# Patient Record
Sex: FEMALE | Race: WHITE | NOT HISPANIC OR LATINO | Employment: OTHER | URBAN - METROPOLITAN AREA
[De-identification: names, ages, dates, MRNs, and addresses within clinical notes are randomized per-mention and may not be internally consistent; named-entity substitution may affect disease eponyms.]

---

## 2023-04-06 ENCOUNTER — TELEPHONE (OUTPATIENT)
Dept: SPORTS MEDICINE | Facility: CLINIC | Age: 68
End: 2023-04-06
Payer: MEDICARE

## 2023-04-06 NOTE — TELEPHONE ENCOUNTER
Spoke with pt's daughter regarding appt. She stated that pt had bilateral knee replacement in 2004. After recent hospitalization in December 2022, having a lot of pain in knees. Explained that Dr. Bowman is a non-surgical provider and that pt would need to see joint replacement surgeon. Pt's daughter was concerned about time frame until seeing Dr. Olvera. Informed her I would reach out to provider's staff regarding scheduling. Pt's daughter was grateful for the call.    ----- Message from Rocio Garcia sent at 4/6/2023  3:17 PM CDT -----  Contact: Nohemy Slater was returning the phone call. Please call her back at [phone].     Thanks  TS

## 2023-04-10 ENCOUNTER — PATIENT MESSAGE (OUTPATIENT)
Dept: SPORTS MEDICINE | Facility: CLINIC | Age: 68
End: 2023-04-10
Payer: MEDICARE

## 2023-04-10 ENCOUNTER — TELEPHONE (OUTPATIENT)
Dept: SPORTS MEDICINE | Facility: CLINIC | Age: 68
End: 2023-04-10
Payer: MEDICARE

## 2023-04-10 NOTE — TELEPHONE ENCOUNTER
ROSLYNM in reference to scheduling appt for william knee pain s/p william TKA 2004. Informed pt would need to schedule with Dr. Olvera per staff. Provided call back number to schedule.

## 2023-05-31 ENCOUNTER — PATIENT MESSAGE (OUTPATIENT)
Dept: RESEARCH | Facility: HOSPITAL | Age: 68
End: 2023-05-31
Payer: MEDICARE

## 2023-07-12 DIAGNOSIS — M25.561 PAIN IN BOTH KNEES, UNSPECIFIED CHRONICITY: Primary | ICD-10-CM

## 2023-07-12 DIAGNOSIS — M25.562 PAIN IN BOTH KNEES, UNSPECIFIED CHRONICITY: Primary | ICD-10-CM

## 2023-07-27 ENCOUNTER — OFFICE VISIT (OUTPATIENT)
Dept: ORTHOPEDICS | Facility: CLINIC | Age: 68
End: 2023-07-27
Payer: MEDICARE

## 2023-07-27 ENCOUNTER — HOSPITAL ENCOUNTER (OUTPATIENT)
Dept: RADIOLOGY | Facility: HOSPITAL | Age: 68
Discharge: HOME | End: 2023-07-27
Attending: ORTHOPAEDIC SURGERY
Payer: MEDICARE

## 2023-07-27 VITALS — BODY MASS INDEX: 50.02 KG/M2 | WEIGHT: 293 LBS | HEIGHT: 64 IN

## 2023-07-27 DIAGNOSIS — T84.82XA ARTHROFIBROSIS OF TOTAL KNEE ARTHROPLASTY, INITIAL ENCOUNTER: ICD-10-CM

## 2023-07-27 DIAGNOSIS — Z96.651 HISTORY OF TOTAL RIGHT KNEE REPLACEMENT: ICD-10-CM

## 2023-07-27 DIAGNOSIS — M25.551 BILATERAL HIP PAIN: Primary | ICD-10-CM

## 2023-07-27 DIAGNOSIS — Z96.652 HISTORY OF TOTAL LEFT KNEE REPLACEMENT: Primary | ICD-10-CM

## 2023-07-27 DIAGNOSIS — M25.552 BILATERAL HIP PAIN: Primary | ICD-10-CM

## 2023-07-27 DIAGNOSIS — M25.551 BILATERAL HIP PAIN: ICD-10-CM

## 2023-07-27 DIAGNOSIS — M25.561 PAIN IN BOTH KNEES, UNSPECIFIED CHRONICITY: ICD-10-CM

## 2023-07-27 DIAGNOSIS — M25.552 BILATERAL HIP PAIN: ICD-10-CM

## 2023-07-27 DIAGNOSIS — M25.562 PAIN IN BOTH KNEES, UNSPECIFIED CHRONICITY: ICD-10-CM

## 2023-07-27 PROBLEM — I73.9 PAD (PERIPHERAL ARTERY DISEASE): Status: ACTIVE | Noted: 2023-01-06

## 2023-07-27 PROBLEM — F41.9 ANXIETY: Status: ACTIVE | Noted: 2017-12-29

## 2023-07-27 PROBLEM — R53.81 DEBILITY: Status: ACTIVE | Noted: 2023-01-03

## 2023-07-27 PROBLEM — E78.5 HYPERLIPIDEMIA: Status: ACTIVE | Noted: 2023-07-27

## 2023-07-27 PROBLEM — L30.9 DERMATITIS: Status: ACTIVE | Noted: 2023-04-18

## 2023-07-27 PROBLEM — I10 HYPERTENSION: Status: ACTIVE | Noted: 2023-07-27

## 2023-07-27 PROBLEM — I42.1 HYPERTROPHIC OBSTRUCTIVE CARDIOMYOPATHY: Status: ACTIVE | Noted: 2023-07-27

## 2023-07-27 PROBLEM — I50.30 DIASTOLIC HEART FAILURE: Status: ACTIVE | Noted: 2023-01-02

## 2023-07-27 PROBLEM — I07.1 MODERATE TRICUSPID REGURGITATION BY PRIOR ECHOCARDIOGRAM: Status: ACTIVE | Noted: 2019-07-05

## 2023-07-27 PROBLEM — F33.1 MODERATE EPISODE OF RECURRENT MAJOR DEPRESSIVE DISORDER: Status: ACTIVE | Noted: 2023-01-24

## 2023-07-27 PROBLEM — B37.2 INTERTRIGINOUS CANDIDIASIS: Status: ACTIVE | Noted: 2023-01-02

## 2023-07-27 PROBLEM — I35.0 MODERATE AORTIC STENOSIS: Status: ACTIVE | Noted: 2019-07-05

## 2023-07-27 PROBLEM — R73.9 HYPERGLYCEMIA: Status: ACTIVE | Noted: 2023-07-10

## 2023-07-27 PROBLEM — M15.9 PRIMARY OSTEOARTHRITIS INVOLVING MULTIPLE JOINTS: Status: ACTIVE | Noted: 2023-01-23

## 2023-07-27 PROBLEM — G47.33 OSA (OBSTRUCTIVE SLEEP APNEA): Status: ACTIVE | Noted: 2023-01-02

## 2023-07-27 PROBLEM — E66.01 MORBID OBESITY WITH BODY MASS INDEX OF 60.0-69.9 IN ADULT: Status: ACTIVE | Noted: 2018-06-29

## 2023-07-27 PROBLEM — R06.00 DYSPNEA: Status: ACTIVE | Noted: 2023-07-27

## 2023-07-27 PROBLEM — G89.29 CHRONIC PAIN OF BOTH KNEES: Status: ACTIVE | Noted: 2023-01-18

## 2023-07-27 PROBLEM — K21.9 GASTROESOPHAGEAL REFLUX DISEASE WITHOUT ESOPHAGITIS: Status: ACTIVE | Noted: 2023-01-10

## 2023-07-27 PROBLEM — E66.01 MORBID OBESITY: Status: ACTIVE | Noted: 2023-07-27

## 2023-07-27 PROBLEM — R01.1 HEART MURMUR: Status: ACTIVE | Noted: 2023-07-27

## 2023-07-27 PROCEDURE — 3008F BODY MASS INDEX DOCD: CPT | Mod: CPTII,S$GLB,, | Performed by: ORTHOPAEDIC SURGERY

## 2023-07-27 PROCEDURE — 99204 PR OFFICE/OUTPT VISIT, NEW, LEVL IV, 45-59 MIN: ICD-10-PCS | Mod: S$GLB,,, | Performed by: ORTHOPAEDIC SURGERY

## 2023-07-27 PROCEDURE — 1159F PR MEDICATION LIST DOCUMENTED IN MEDICAL RECORD: ICD-10-PCS | Mod: CPTII,S$GLB,, | Performed by: ORTHOPAEDIC SURGERY

## 2023-07-27 PROCEDURE — 3288F FALL RISK ASSESSMENT DOCD: CPT | Mod: CPTII,S$GLB,, | Performed by: ORTHOPAEDIC SURGERY

## 2023-07-27 PROCEDURE — 73560 X-RAY EXAM OF KNEE 1 OR 2: CPT | Mod: TC,50

## 2023-07-27 PROCEDURE — 1160F RVW MEDS BY RX/DR IN RCRD: CPT | Mod: CPTII,S$GLB,, | Performed by: ORTHOPAEDIC SURGERY

## 2023-07-27 PROCEDURE — 3008F PR BODY MASS INDEX (BMI) DOCUMENTED: ICD-10-PCS | Mod: CPTII,S$GLB,, | Performed by: ORTHOPAEDIC SURGERY

## 2023-07-27 PROCEDURE — 99204 OFFICE O/P NEW MOD 45 MIN: CPT | Mod: S$GLB,,, | Performed by: ORTHOPAEDIC SURGERY

## 2023-07-27 PROCEDURE — 4010F ACE/ARB THERAPY RXD/TAKEN: CPT | Mod: CPTII,S$GLB,, | Performed by: ORTHOPAEDIC SURGERY

## 2023-07-27 PROCEDURE — 73560 XR KNEE 1 OR 2 VIEW BILATERAL: ICD-10-PCS | Mod: 26,50,, | Performed by: RADIOLOGY

## 2023-07-27 PROCEDURE — 4010F PR ACE/ARB THEARPY RXD/TAKEN: ICD-10-PCS | Mod: CPTII,S$GLB,, | Performed by: ORTHOPAEDIC SURGERY

## 2023-07-27 PROCEDURE — 1126F AMNT PAIN NOTED NONE PRSNT: CPT | Mod: CPTII,S$GLB,, | Performed by: ORTHOPAEDIC SURGERY

## 2023-07-27 PROCEDURE — 1101F PR PT FALLS ASSESS DOC 0-1 FALLS W/OUT INJ PAST YR: ICD-10-PCS | Mod: CPTII,S$GLB,, | Performed by: ORTHOPAEDIC SURGERY

## 2023-07-27 PROCEDURE — 3288F PR FALLS RISK ASSESSMENT DOCUMENTED: ICD-10-PCS | Mod: CPTII,S$GLB,, | Performed by: ORTHOPAEDIC SURGERY

## 2023-07-27 PROCEDURE — 1126F PR PAIN SEVERITY QUANTIFIED, NO PAIN PRESENT: ICD-10-PCS | Mod: CPTII,S$GLB,, | Performed by: ORTHOPAEDIC SURGERY

## 2023-07-27 PROCEDURE — 1160F PR REVIEW ALL MEDS BY PRESCRIBER/CLIN PHARMACIST DOCUMENTED: ICD-10-PCS | Mod: CPTII,S$GLB,, | Performed by: ORTHOPAEDIC SURGERY

## 2023-07-27 PROCEDURE — 1159F MED LIST DOCD IN RCRD: CPT | Mod: CPTII,S$GLB,, | Performed by: ORTHOPAEDIC SURGERY

## 2023-07-27 PROCEDURE — 1101F PT FALLS ASSESS-DOCD LE1/YR: CPT | Mod: CPTII,S$GLB,, | Performed by: ORTHOPAEDIC SURGERY

## 2023-07-27 PROCEDURE — 73560 X-RAY EXAM OF KNEE 1 OR 2: CPT | Mod: 26,50,, | Performed by: RADIOLOGY

## 2023-07-27 PROCEDURE — 99999 PR PBB SHADOW E&M-EST. PATIENT-LVL III: CPT | Mod: PBBFAC,,, | Performed by: ORTHOPAEDIC SURGERY

## 2023-07-27 PROCEDURE — 99999 PR PBB SHADOW E&M-EST. PATIENT-LVL III: ICD-10-PCS | Mod: PBBFAC,,, | Performed by: ORTHOPAEDIC SURGERY

## 2023-07-27 RX ORDER — FUROSEMIDE 80 MG/1
80 TABLET ORAL
COMMUNITY

## 2023-07-27 RX ORDER — AMITRIPTYLINE HYDROCHLORIDE 50 MG/1
50 TABLET, FILM COATED ORAL NIGHTLY
COMMUNITY
Start: 2023-07-03

## 2023-07-27 RX ORDER — HYDROXYZINE HYDROCHLORIDE 25 MG/1
25 TABLET, FILM COATED ORAL 2 TIMES DAILY
COMMUNITY
Start: 2023-07-10

## 2023-07-27 RX ORDER — TRIAMCINOLONE ACETONIDE 1 MG/G
CREAM TOPICAL
COMMUNITY
Start: 2023-07-27

## 2023-07-27 RX ORDER — IBUPROFEN 200 MG
200 TABLET ORAL EVERY 6 HOURS PRN
COMMUNITY

## 2023-07-27 NOTE — PATIENT INSTRUCTIONS
X-ray of both of her knees showed that your total knee replacement has not failed they still there   Your bones look like they are very weak and osteoporotic  Your hips are not moving specially on the left are all on the right there is very slight motion  You have very stiff both knees had you can not bend because her hips are stiff eventually you stiffened up and created severe scar tissue in both of her knees  The only time you can do something about stiff knees is the 1st 3-6 months where you can manipulate the knee at that time and tear the scar tissue.  Right now you are from 2005 far out and that could not be accomplished  I would like you to get x-rays on your hips because the not moving and what you describing to me heaviness in the legs maybe we can get her hips injected under x-ray machine by pain management to see if that might help at least for you to be able to move and do several steps to get around  Will schedule you for x-rays of both hips in the hospital

## 2023-07-27 NOTE — PROGRESS NOTES
Subjective:     Patient ID: Kimberley Levy is a 67 y.o. female.    Chief Complaint: Pain of the Left Knee and Pain of the Right Knee    HPI:  Inability to stand and take several steps since she was admitted in December for CHF.  She stated that in 2005 had bilateral total knee replacement by Dr. Mejia.  Afterwards she could not do her physical therapy because of loss of insurance and she had to take care of her .  She claims she was able to take 7-8 steps at least and get to do her activities of daily living however right now she has been in a nursing home since December 20 22.  Diagnosed with CHF.  She does have a walker but she can not use it.  She was sent to the nursing home for physical therapy and after certain amount the insurance denied her physical therapy.  She is looking to leave the nursing home if she can get back with her daughter and her son-in-law and live with them.  They have arrange for her to have power lift chair that she can pushed the button get her to stand she has a walker and she can walk to the bedside commode and she has a disability shower and bathroom that she can manage.  Her main issue is can not walk these 3 4 steps the hips do not move.  She feels some heaviness in the groin.  She is not complaining of any pain she is not looking to have any surgeries.  I did tell her surgery with her medical history is very risky.  She had gained a lot of weight she is now 332 lb with BMI of 57.14 due to immobility.  Her daughter helps her home and her son-in-law.  She takes ibuprofen 200 mg ordered every 6 hours as needed among the medications list      No past medical history on file.  No past surgical history on file.  No family history on file.  Social History     Socioeconomic History    Marital status:    Tobacco Use    Smoking status: Never    Smokeless tobacco: Never     Medication List with Changes/Refills   Current Medications    AMITRIPTYLINE (ELAVIL) 50 MG TABLET    Take 50  mg by mouth every evening.    AMLODIPINE (NORVASC) 10 MG TABLET    TAKE 1 TABLET EVERY DAY    BUSPIRONE (BUSPAR) 10 MG TABLET    TAKE 1 TABLET TWICE DAILY    FUROSEMIDE (LASIX) 80 MG TABLET    Take 80 mg by mouth.    HYDROXYZINE HCL (ATARAX) 25 MG TABLET    Take 25 mg by mouth 2 (two) times daily.    IBUPROFEN (ADVIL,MOTRIN) 200 MG TABLET    Take 200 mg by mouth every 6 (six) hours as needed for Pain.    LISINOPRIL (PRINIVIL,ZESTRIL) 2.5 MG TABLET    Take 1 tablet (2.5 mg total) by mouth once daily. Please call us at [phone] to schedule check up.    METOPROLOL SUCCINATE (TOPROL-XL) 25 MG 24 HR TABLET    TAKE 1 TABLET EVERY DAY    PANTOPRAZOLE (PROTONIX) 40 MG TABLET    TAKE 1 TABLET EVERY MORNING    PRAVASTATIN (PRAVACHOL) 40 MG TABLET    TAKE 1 TABLET EVERY EVENING    TERBINAFINE HCL (LAMISIL) 1 % CREAM    1 THIN LAYER  .    TRIAMCINOLONE ACETONIDE 0.1% (KENALOG) 0.1 % CREAM         Review of patient's allergies indicates:   Allergen Reactions    Penicillins Hives    Prednisone      Review of Systems   Constitutional: Negative for decreased appetite.   HENT:  Negative for tinnitus.    Eyes:  Negative for double vision.   Cardiovascular:  Negative for chest pain.   Respiratory:  Negative for wheezing.    Hematologic/Lymphatic: Negative for bleeding problem.   Skin:  Negative for dry skin.   Musculoskeletal:  Positive for arthritis, back pain, joint pain, joint swelling and stiffness. Negative for gout and neck pain.   Gastrointestinal:  Negative for abdominal pain.   Genitourinary:  Negative for bladder incontinence.   Neurological:  Negative for numbness, paresthesias and sensory change.   Psychiatric/Behavioral:  Negative for altered mental status.      Objective:   Body mass index is 57.14 kg/m².  There were no vitals filed for this visit.       General    Constitutional: She is oriented to person, place, and time. She appears well-developed.   HENT:   Head: Atraumatic.   Eyes: EOM are normal.    Pulmonary/Chest: Effort normal.   Neurological: She is alert and oriented to person, place, and time.   Psychiatric: Judgment normal.         Patient in a wheelchair  Left lower extremity there is absolutely no motion in the left hip you can not roll the hip in or out stuck in slight external rotation.  Unable to lift the leg up with severely weak flexors and absolutely no muscle twitching in abduction.    Left knee is stuck in full extension is absolutely no motion midline surgical scar from total knee replacement is healed  There is quite a bit of swelling and erythema around the proximal tibia all the way down to the ankle and pitting edema  Right lower extremity is maybe 5° of internal rotation and 5° of external rotation of the right hip.  There is maybe 5° of flexion and extension on the right knee.  Midline surgical scar healed well.  Is marked swelling.  Very weak hip musculatures unable to lift the leg up  She is able to slightly move her ankle up and down  Skin with dermatitis and cellulitis bilateral tibia    Relevant imaging results reviewed and interpreted by me, discussed with the patient and / or family today   X-ray 07/27/2023 bilateral knees with bilateral total knee arthroplasty cruciate sparing knees without evidence of any fracture.  There is calcification around the joint in the soft tissues.  There is subcutaneous swelling.  I do not see any failure of the prosthesis however patient could not stand on the legs to have the x-ray standing films  Assessment:     Encounter Diagnoses   Name Primary?    History of total left knee replacement Yes    History of total right knee replacement     Bilateral hip pain     Arthrofibrosis of total knee arthroplasty, initial encounter         Plan:   History of total left knee replacement    History of total right knee replacement    Bilateral hip pain    Arthrofibrosis of total knee arthroplasty, initial encounter         Patient Instructions   X-ray of both  of her knees showed that your total knee replacement has not failed they still there   Your bones look like they are very weak and osteoporotic  Your hips are not moving specially on the left are all on the right there is very slight motion  You have very stiff both knees had you can not bend because her hips are stiff eventually you stiffened up and created severe scar tissue in both of her knees  The only time you can do something about stiff knees is the 1st 3-6 months where you can manipulate the knee at that time and tear the scar tissue.  Right now you are from 2005 far out and that could not be accomplished  I would like you to get x-rays on your hips because the not moving and what you describing to me heaviness in the legs maybe we can get her hips injected under x-ray machine by pain management to see if that might help at least for you to be able to move and do several steps to get around  Will schedule you for x-rays of both hips in the hospital        Disclaimer: This note was prepared using a voice recognition system and is likely to have sound alike errors within the text.

## 2023-07-28 ENCOUNTER — TELEPHONE (OUTPATIENT)
Dept: ORTHOPEDICS | Facility: CLINIC | Age: 68
End: 2023-07-28
Payer: MEDICARE

## 2023-07-28 NOTE — TELEPHONE ENCOUNTER
[facility] need to do the xray at our hospital so they can be viewed in our system and so she is able to do them standing up.

## 2023-07-28 NOTE — TELEPHONE ENCOUNTER
----- Message from Nubia hSeets sent at 7/28/2023 11:14 AM CDT -----  Regarding: call back  Name of Who is Calling: Romi Mckeon or Pillo         What is the request in detail: pt has an appt on 8/7 and and xray but the xray has to be done before the appt, and nurse would like to know if the xray can be done at their facility on 8/5, please advise.         Can the clinic reply by [facility]: no           What [facility] to [facility] Back [facility]: [phone]

## 2023-08-07 ENCOUNTER — OFFICE VISIT (OUTPATIENT)
Dept: ORTHOPEDICS | Facility: CLINIC | Age: 68
End: 2023-08-07
Payer: MEDICARE

## 2023-08-07 ENCOUNTER — HOSPITAL ENCOUNTER (OUTPATIENT)
Dept: RADIOLOGY | Facility: HOSPITAL | Age: 68
Discharge: HOME | End: 2023-08-07
Attending: ORTHOPAEDIC SURGERY
Payer: MEDICARE

## 2023-08-07 ENCOUNTER — TELEPHONE (OUTPATIENT)
Dept: PAIN MEDICINE | Facility: CLINIC | Age: 68
End: 2023-08-07
Payer: MEDICARE

## 2023-08-07 VITALS — BODY MASS INDEX: 50.02 KG/M2 | HEIGHT: 64 IN | WEIGHT: 293 LBS

## 2023-08-07 DIAGNOSIS — Z96.652 HISTORY OF TOTAL LEFT KNEE REPLACEMENT: Primary | ICD-10-CM

## 2023-08-07 DIAGNOSIS — I89.0 LYMPHEDEMA ASSOCIATED WITH OBESITY: ICD-10-CM

## 2023-08-07 DIAGNOSIS — M25.552 BILATERAL HIP PAIN: ICD-10-CM

## 2023-08-07 DIAGNOSIS — E66.01 MORBID OBESITY WITH BMI OF 50.0-59.9, ADULT: ICD-10-CM

## 2023-08-07 DIAGNOSIS — M16.11 ARTHRITIS OF RIGHT HIP: ICD-10-CM

## 2023-08-07 DIAGNOSIS — M16.12 ARTHRITIS OF LEFT HIP: ICD-10-CM

## 2023-08-07 DIAGNOSIS — L40.9 PSORIASIS: ICD-10-CM

## 2023-08-07 DIAGNOSIS — Z96.651 HISTORY OF TOTAL RIGHT KNEE REPLACEMENT: ICD-10-CM

## 2023-08-07 DIAGNOSIS — E66.9 LYMPHEDEMA ASSOCIATED WITH OBESITY: ICD-10-CM

## 2023-08-07 DIAGNOSIS — T84.82XA ARTHROFIBROSIS OF TOTAL KNEE ARTHROPLASTY, INITIAL ENCOUNTER: ICD-10-CM

## 2023-08-07 DIAGNOSIS — M25.551 BILATERAL HIP PAIN: ICD-10-CM

## 2023-08-07 PROCEDURE — 73521 XR HIPS BILATERAL 2 VIEW INCL AP PELVIS: ICD-10-PCS | Mod: 26,,, | Performed by: RADIOLOGY

## 2023-08-07 PROCEDURE — 1159F PR MEDICATION LIST DOCUMENTED IN MEDICAL RECORD: ICD-10-PCS | Mod: CPTII,S$GLB,, | Performed by: ORTHOPAEDIC SURGERY

## 2023-08-07 PROCEDURE — 3008F BODY MASS INDEX DOCD: CPT | Mod: CPTII,S$GLB,, | Performed by: ORTHOPAEDIC SURGERY

## 2023-08-07 PROCEDURE — 1126F AMNT PAIN NOTED NONE PRSNT: CPT | Mod: CPTII,S$GLB,, | Performed by: ORTHOPAEDIC SURGERY

## 2023-08-07 PROCEDURE — 1159F MED LIST DOCD IN RCRD: CPT | Mod: CPTII,S$GLB,, | Performed by: ORTHOPAEDIC SURGERY

## 2023-08-07 PROCEDURE — 99214 OFFICE O/P EST MOD 30 MIN: CPT | Mod: S$GLB,,, | Performed by: ORTHOPAEDIC SURGERY

## 2023-08-07 PROCEDURE — 99999 PR PBB SHADOW E&M-EST. PATIENT-LVL III: ICD-10-PCS | Mod: PBBFAC,,, | Performed by: ORTHOPAEDIC SURGERY

## 2023-08-07 PROCEDURE — 1126F PR PAIN SEVERITY QUANTIFIED, NO PAIN PRESENT: ICD-10-PCS | Mod: CPTII,S$GLB,, | Performed by: ORTHOPAEDIC SURGERY

## 2023-08-07 PROCEDURE — 1101F PR PT FALLS ASSESS DOC 0-1 FALLS W/OUT INJ PAST YR: ICD-10-PCS | Mod: CPTII,S$GLB,, | Performed by: ORTHOPAEDIC SURGERY

## 2023-08-07 PROCEDURE — 99214 PR OFFICE/OUTPT VISIT, EST, LEVL IV, 30-39 MIN: ICD-10-PCS | Mod: S$GLB,,, | Performed by: ORTHOPAEDIC SURGERY

## 2023-08-07 PROCEDURE — 73521 X-RAY EXAM HIPS BI 2 VIEWS: CPT | Mod: TC

## 2023-08-07 PROCEDURE — 3288F PR FALLS RISK ASSESSMENT DOCUMENTED: ICD-10-PCS | Mod: CPTII,S$GLB,, | Performed by: ORTHOPAEDIC SURGERY

## 2023-08-07 PROCEDURE — 3008F PR BODY MASS INDEX (BMI) DOCUMENTED: ICD-10-PCS | Mod: CPTII,S$GLB,, | Performed by: ORTHOPAEDIC SURGERY

## 2023-08-07 PROCEDURE — 99999 PR PBB SHADOW E&M-EST. PATIENT-LVL III: CPT | Mod: PBBFAC,,, | Performed by: ORTHOPAEDIC SURGERY

## 2023-08-07 PROCEDURE — 73521 X-RAY EXAM HIPS BI 2 VIEWS: CPT | Mod: 26,,, | Performed by: RADIOLOGY

## 2023-08-07 PROCEDURE — 3288F FALL RISK ASSESSMENT DOCD: CPT | Mod: CPTII,S$GLB,, | Performed by: ORTHOPAEDIC SURGERY

## 2023-08-07 PROCEDURE — 4010F PR ACE/ARB THEARPY RXD/TAKEN: ICD-10-PCS | Mod: CPTII,S$GLB,, | Performed by: ORTHOPAEDIC SURGERY

## 2023-08-07 PROCEDURE — 1101F PT FALLS ASSESS-DOCD LE1/YR: CPT | Mod: CPTII,S$GLB,, | Performed by: ORTHOPAEDIC SURGERY

## 2023-08-07 PROCEDURE — 4010F ACE/ARB THERAPY RXD/TAKEN: CPT | Mod: CPTII,S$GLB,, | Performed by: ORTHOPAEDIC SURGERY

## 2023-08-07 NOTE — PATIENT INSTRUCTIONS
We you have psoriatic lesions on your hands  I would like you to see the Rheumatology Department for treatment.  Your arthritis could be secondary to psoriasis  Your x-rays of both of her hips showing arthritic condition and loss of joint space   You have swelling in the legs which we call lymphedema  Your goal is to be able to do transfers on your own  Recommendations  One to go see Rheumatology maybe they can treat her psoriasis and that might help with the pain so you can function better  We need extensive physical therapy to allow you to ambulate and ability to do transfers independent  We need to send you to lymphedema clinic which is therapy for the legs and they could help you with the swelling in the lower extremities at [facility] on [address]  I will refer you to pain management for bilateral hip injections under fluoroscopy

## 2023-08-07 NOTE — PROGRESS NOTES
Subjective:     Patient ID: Kimberley Levy is a 67 y.o. female.    Chief Complaint: Pain of the Left Knee, Pain of the Right Knee, Pain of the Right Hip, and Pain of the Left Hip    HPI:  Inability to stand and take several steps since she was admitted in December for CHF.  She stated that in 2005 had bilateral total knee replacement by Dr. Plaza.  Afterwards she could not do her physical therapy because of loss of insurance and she had to take care of her .  She claims she was able to take 7-8 steps at least and get to do her activities of daily living however right now she has been in a nursing home since December 20 22.  Diagnosed with CHF.  She does have a walker but she can not use it.  She was sent to the nursing home for physical therapy and after certain amount the insurance denied her physical therapy.  She is looking to leave the nursing home if she can get back with her daughter and her son-in-law and live with them.  They have arrange for her to have power lift chair that she can pushed the button get her to stand she has a walker and she can walk to the bedside commode and she has a disability shower and bathroom that she can manage.  Her main issue is can not walk these 3 4 steps the hips do not move.  She feels some heaviness in the groin.  She is not complaining of any pain she is not looking to have any surgeries.  I did tell her surgery with her medical history is very risky.  She had gained a lot of weight she is now 332 lb with BMI of 57.14 due to immobility.  Her daughter helps her home and her son-in-law.  She takes ibuprofen 200 mg ordered every 6 hours as needed among the medications list          08/07/2023   The goal as at this time to be able to get up from a sitting position to do transfers.  Like this she can go home if she can achieve that.  She has marked swelling in the lower extremities 1 leg a little bit long more than the other.  She has both knees replaced years ago.  Last  visit her exam we could not move her hips much and her pelvis tilted.  We obtained x-rays and we went over the x-rays with her showing arthritis in both of them.  She has these psoriatic lesions on her upper extremities in the dorsum of the wrist and around the forearm etcetera that had not been evaluated by Rheumatology or treated  Bilateral total knee replacements and they are right now stiff    History reviewed. No pertinent past medical history.  History reviewed. No pertinent surgical history.  History reviewed. No pertinent family history.  Social History     Socioeconomic History    Marital status:    Tobacco Use    Smoking status: Never    Smokeless tobacco: Never     Medication List with Changes/Refills   Current Medications    AMITRIPTYLINE (ELAVIL) 50 MG TABLET    Take 50 mg by mouth every evening.    AMLODIPINE (NORVASC) 10 MG TABLET    TAKE 1 TABLET EVERY DAY    BUSPIRONE (BUSPAR) 10 MG TABLET    TAKE 1 TABLET TWICE DAILY    FUROSEMIDE (LASIX) 80 MG TABLET    Take 80 mg by mouth.    HYDROXYZINE HCL (ATARAX) 25 MG TABLET    Take 25 mg by mouth 2 (two) times daily.    IBUPROFEN (ADVIL,MOTRIN) 200 MG TABLET    Take 200 mg by mouth every 6 (six) hours as needed for Pain.    LISINOPRIL (PRINIVIL,ZESTRIL) 2.5 MG TABLET    Take 1 tablet (2.5 mg total) by mouth once daily. Please call us at [phone] to schedule check up.    METOPROLOL SUCCINATE (TOPROL-XL) 25 MG 24 HR TABLET    TAKE 1 TABLET EVERY DAY    PANTOPRAZOLE (PROTONIX) 40 MG TABLET    TAKE 1 TABLET EVERY MORNING    PRAVASTATIN (PRAVACHOL) 40 MG TABLET    TAKE 1 TABLET EVERY EVENING    TERBINAFINE HCL (LAMISIL) 1 % CREAM    1 THIN LAYER  .    TRIAMCINOLONE ACETONIDE 0.1% (KENALOG) 0.1 % CREAM         Review of patient's allergies indicates:   Allergen Reactions    Penicillins Hives    Prednisone      Review of Systems   Constitutional: Negative for decreased appetite.   HENT:  Negative for tinnitus.    Eyes:  Negative for double vision.    Cardiovascular:  Negative for chest pain.   Respiratory:  Negative for wheezing.    Hematologic/Lymphatic: Negative for bleeding problem.   Skin:  Negative for dry skin.   Musculoskeletal:  Positive for arthritis, back pain, joint pain, joint swelling and stiffness. Negative for gout and neck pain.   Gastrointestinal:  Negative for abdominal pain.   Genitourinary:  Negative for bladder incontinence.   Neurological:  Negative for numbness, paresthesias and sensory change.   Psychiatric/Behavioral:  Negative for altered mental status.        Objective:   Body mass index is 57.14 kg/m².  There were no vitals filed for this visit.       General    Constitutional: She is oriented to person, place, and time. She appears well-developed.   HENT:   Head: Atraumatic.   Eyes: EOM are normal.   Pulmonary/Chest: Effort normal.   Neurological: She is alert and oriented to person, place, and time.   Psychiatric: Judgment normal.           Patient in a wheelchair  Left lower extremity there is absolutely no motion in the left hip you can not roll the hip in or out stuck in slight external rotation.  Unable to lift the leg up with severely weak flexors and absolutely no muscle twitching in abduction.    Left knee is stuck in full extension is absolutely no motion midline surgical scar from total knee replacement is healed  There is quite a bit of swelling and erythema around the proximal tibia all the way down to the ankle and pitting edema  Right lower extremity is maybe 5° of internal rotation and 5° of external rotation of the right hip.  There is maybe 5° of flexion and extension on the right knee.  Midline surgical scar healed well.  Is marked swelling.  Very weak hip musculatures unable to lift the leg up  She is able to slightly move her ankle up and down  Skin with dermatitis and cellulitis bilateral tibia  There is moderately severe lymphedema in the both lower extremities    Relevant imaging results reviewed and  interpreted by me, discussed with the patient and / or family today   X-ray 08/7/23 bilateral hips with moderately severe arthritic changes cystic changes and joint space narrowing  X-ray 07/27/2023 bilateral knees with bilateral total knee arthroplasty cruciate sparing knees without evidence of any fracture.  There is calcification around the joint in the soft tissues.  There is subcutaneous swelling.  I do not see any failure of the prosthesis however patient could not stand on the legs to have the x-ray standing films  Assessment:     Encounter Diagnoses   Name Primary?    History of total left knee replacement Yes    History of total right knee replacement     Arthrofibrosis of total knee arthroplasty, initial encounter     Arthritis of right hip     Arthritis of left hip     Morbid obesity with BMI of 50.0-59.9, adult     Lymphedema associated with obesity     Psoriasis         Plan:   History of total left knee replacement    History of total right knee replacement    Arthrofibrosis of total knee arthroplasty, initial encounter    Arthritis of right hip    Arthritis of left hip    Morbid obesity with BMI of 50.0-59.9, adult    Lymphedema associated with obesity    Psoriasis         Patient Instructions   We you have psoriatic lesions on your hands  I would like you to see the Rheumatology Department for treatment.  Your arthritis could be secondary to psoriasis  Your x-rays of both of her hips showing arthritic condition and loss of joint space   You have swelling in the legs which we call lymphedema  Your goal is to be able to do transfers on your own  Recommendations  One to go see Rheumatology maybe they can treat her psoriasis and that might help with the pain so you can function better  We need extensive physical therapy to allow you to ambulate and ability to do transfers independent  We need to send you to lymphedema clinic which is therapy for the legs and they could help you with the swelling [facility] the  [facility] [address]  I will refer you to pain management for bilateral hip injections under fluoroscopy        Disclaimer: This note was prepared using a voice recognition system and is likely to have sound alike errors within the text.

## 2023-08-08 ENCOUNTER — TELEPHONE (OUTPATIENT)
Dept: PAIN MEDICINE | Facility: CLINIC | Age: 68
End: 2023-08-08
Payer: MEDICARE

## 2023-08-10 ENCOUNTER — TELEPHONE (OUTPATIENT)
Dept: ORTHOPEDICS | Facility: CLINIC | Age: 68
End: 2023-08-10
Payer: MEDICARE

## 2023-08-10 NOTE — TELEPHONE ENCOUNTER
Left a message letting them know that the patient does not have any upcoming appointment schedule with Dr. Olvera.           ----- Message from Lissette Triana sent at 8/10/2023  1:47 PM CDT -----  Contact: [phone]/Luis E/Son in Law  Luis E called, in regards needing a call back from nurse about suggestion in what to do about the patient upcoming appointments. Thank you.

## 2023-08-10 NOTE — TELEPHONE ENCOUNTER
Returned Luis E/Son in law phone call in regards to the patient. Luis E/Son in law wanted to know if the patient should keep their appointment on 08/15/23 with the lymphedema clinic or if they should wait until October when they are schedule to see pain management for their back. Informed them to keep that appointment that is schedule for 08/15/23 with the lymphedema clinic due to the fact that will help with the swelling in their legs. Verbalized understanding.          ----- Message from Brian Payne sent at 8/10/2023  4:08 PM CDT -----  Contact: Luis E/Son in law  Luis E is needing a call back I regards to addressing some concerns about the patient. Please give him a call back at [phone]

## 2023-08-15 ENCOUNTER — CLINICAL SUPPORT (OUTPATIENT)
Dept: REHABILITATION | Facility: HOSPITAL | Age: 68
End: 2023-08-15
Attending: ORTHOPAEDIC SURGERY
Payer: MEDICARE

## 2023-08-15 DIAGNOSIS — E66.9 LYMPHEDEMA ASSOCIATED WITH OBESITY: ICD-10-CM

## 2023-08-15 DIAGNOSIS — T84.82XA ARTHROFIBROSIS OF TOTAL KNEE ARTHROPLASTY, INITIAL ENCOUNTER: ICD-10-CM

## 2023-08-15 DIAGNOSIS — L40.9 PSORIASIS: ICD-10-CM

## 2023-08-15 DIAGNOSIS — I89.0 LYMPHEDEMA ASSOCIATED WITH OBESITY: ICD-10-CM

## 2023-08-15 PROCEDURE — 97161 PT EVAL LOW COMPLEX 20 MIN: CPT | Mod: PN

## 2023-08-15 PROCEDURE — 97535 SELF CARE MNGMENT TRAINING: CPT | Mod: PN

## 2023-08-15 NOTE — PLAN OF CARE
[facility]   Physical Therapy Initial Evaluation / Lymphedema         Name: Kimberley Levy  Clinic Number: [id number]    Therapy Diagnosis:   Encounter Diagnoses   Name Primary?    Arthrofibrosis of total knee arthroplasty, initial encounter     Lymphedema associated with obesity     Psoriasis       Physician: Mauricio Olvera MD    Physician Orders: PT Eval and Treat  Medical Diagnosis from Referral: Lymphedema, arthrofibrosis of TKA  Evaluation Date: 8/15/2023  Authorization Period Expiration: 8/6/2024  Plan of Care Expiration: 11/7/2023  Visit # / Visits authorized: 1/1  Progress note due: 30 days  Visit # / Visits authorized: 1/ 1    Time In: 0805 am  Time Out: 0900 am  Total Billable Time: 38 minutes    Precautions: Standard, Fall, and CHF    Subjective   Date of onset: years  History of current condition - Kimberley reports: currently living in a SNF but no longer receiving therapy. Was referred to Lymphedema clinic due to chronic leg swelling. She does not walk much due to her legs being to heavy. She is unable to walk since her last episode of CHF. She is planning on leaving the SNF as soon as possible     Medical History:   No past medical history on file.    Surgical History:   Kimberley Levy  has no past surgical history on file.    Medications:   Kimberley has a current medication list which includes the following prescription(s): amitriptyline, amlodipine, buspirone, furosemide, hydroxyzine hcl, ibuprofen, lisinopril, metoprolol succinate, pantoprazole, pravastatin, terbinafine hcl, and triamcinolone acetonide 0.1%.    Allergies:   Review of patient's allergies indicates:   Allergen Reactions    Penicillins Hives    Prednisone         Surgery: -  Radiation:  - weeks  Chemotherapy: -   Hormonal Medications: -   PMH: CHF  Previous Lymphedema Treatment: none  Social History:  lives in a skilled nursing facility  Family Support:  Nutritional status: high BMI  Educational needs: requires  education on lymphedema   Fall risk: yes (currently uses a liana lift)   Occupation: retired  Prior Level of Function: chronic swelling both legs due to high BMI  Current Level of Function: worsening  of swelling both legs  Gait: non-amb  Transfers:uses a liana lift   Bed Mobility: dependent supine to sit     Pain:  Current 0/10, worst 7/10, best 0/10   Location: bilateral knee   Description: Aching  Aggravating Factors: standing, walking  Easing Factors: rest and elevation    Pts goals: manage swelling in both legs    Objective     STAGE  SecondaryStage II Lymphedema  Amount of Swelling: moderate   Location of Swelling: bilateral lower extremities feet, ankle, lower legs, thighs   Skin Integrity: intact   Palpation/Texture: fibrosis present, poor skin elasticity   Circulation: adequate   Posture: slumped in w/c    Range of Motion - UE/LE  (R) knee bends 10 degrees  (L) knee bends 10 degrees    Sensation: intact       Girth Measurements (in centimeters)  LANDMARK LEFT LE  8/15/2023 RIGHT LE  8/15/2023 DIFF   at eval   SBP + 20 cm - cm - cm - cm   SBP + 10 cm - cm - cm - cm   SBP 73.5 cm 79.5 cm - cm   10 cm below SBP - cm - cm - cm   20 cm below SBP 47.5 cm 47.5 cm - cm   30 cm below SBP - cm - cm - cm   35 cm below SBP - cm - cm - cm   Ankle 29.5 cm 29 cm - cm   Forefoot - cm - cm - cm         CMS Impairment/Limitation/Restriction for FOTO lower quadrant swelling Survey    Therapist reviewed FOTO scores for Kimberley Levy on 8/15/2023  FOTO documents entered into [employer] - see Media section.    Functional Score: 98%         TREATMENT   Treatment Time In: 0805 am  Treatment Time Out: 0900 am  Total Treatment time separate from Evaluation: 23 minutes     Kimberley received self care/home management to develop knowledge/understanding of lymphedema etiology, progression and treatment options x 40 minutes including:    PROVIDED LYMPHEDEMA HANDOUT AND REVIEWED THE FOLLOWING INFORMATION:  -discussed etiology of lymphedema and the  lymphatic system  -discussed cellulitis and ways to decrease risk of cellulitis  -provided lymphedema resources     -Measured bilateral lower extremities for velcro calf wraps  -faxed information for calf wraps and compression pump to [facility]    -recommended next visit to be scheduled in about a month. Kimberley was instructed to bring a caregiver to the next session to be trained on how to claudia the calf wraps effectively       Home Exercises and Patient Education Provided    Education provided:   - see self care section above  - Pt was educated in lymphedema etiology and management plans.  Pt was provided with written  risk reductions and precautions for managing lymphedema.      This patient is in agreement to participate in Lymphedema treatment.    Written Home Exercises and/or information Provided: yes.  Information was reviewed and Kimberley was able to demonstrate understanding prior to the end of the session.  Kimberley demonstrated good  understanding of the education provided.     See EMR under Media for exercises provided 8/15/2023.    Assessment   Kimberley is a 67 y.o. female referred to [facility] with a medical diagnosis of lymphedema associated with obesity. This patient presents with hx of high BMI and swelling of both extremities resulting in: Stage II lymphedema of the bilateral lower extremities, increased pain, increased stiffness in the legs, as well as difficulty performing walking, transfers and amb , and placing the pt at higher risk of infection.     Pt prognosis is Good.   Pt will benefit from skilled outpatient Physical Therapy to address the deficits stated above and in the chart below, provide pt/family education, and to maximize pt's level of independence.     Plan of care discussed with patient: Yes  Pt's spiritual, cultural and educational needs considered and patient is agreeable to the plan of care and goals as stated below:     Anticipated Barriers for therapy: w/c  dependent    Medical Necessity is demonstrated by the following  History  Co-morbidities and personal factors that may impact the plan of care [] LOW: no personal factors / co-morbidities  [] MODERATE: 1-2 personal factors / co-morbidities  [x] HIGH: 3+ personal factors / co-morbidities    Moderate / High Support Documentation:   Co-morbidities affecting plan of care: immobile, wheelchair bound, obesity, dependent for all aspects of functional mobility, CHF    Personal Factors:   no deficits     Examination  Body Structures and Functions, activity limitations and participation restrictions that may impact the plan of care [x] LOW: addressing 1-2 elements  [] MODERATE: 3+ elements  [] HIGH: 4+ elements (please support below)    Moderate / High Support Documentation: -     Clinical Presentation [x] LOW: stable  [] MODERATE: Evolving  [] HIGH: Unstable     Decision Making/ Complexity Score: low         The following goals were discussed with the patient and patient is in agreement with them as to be addressed in the treatment plan.     Short Term Goals: (6 weeks)  1. Patient will show decreased girth in bilateral lower extremities by up to 4 cm to allow for LE symmetry, shoe and clothing choice, and ability to apply needed compression.  (progressing, not met)   2. Patient will demonstrate 100% knowledge of lymphedema precautions and signs of infection to allow for reduced lymphedema risk, infection risk, and/or exacerbation of condition.  (progressing, not met)  3. Patient or caregiver will perform self-bandaging techniques and/or wearing of compression garments to allow for lymphatic drainage support, skin elasticity, and reduction in shape and size of limb. (progressing, not met)  4. Patient will perform self lymph drainage techniques to areas within reach to enhance lymphatic drainage and skin condition.  (progressing, not met)  5. Patient will tolerate daily activities with multilayered bandaging to allow for  lymphatic and venous support.  (progressing, not met)    Long Term Goals: (12  weeks)  1. Patient will show decreased girth in B LE by up to 6 cm  to allow for LE symmetry, shoe and clothing choice, and ability to apply needed compression daily.  (progressing, not met)  2. Patient will show reduction in density to mild or less with improved contour of limb to allow for cosmesis, LE symmetry, infection risk reduction, and clothing and compression choice.   (progressing, not met)  3. Patient to claudia/doff compression garment with daily compliance to assist in lymphedema management, skin elasticity, and tissue density.  (progressing, not met)  4. Pt to show improved postural awareness and alignment.  (progressing, not met)  5. Pt to be I and compliant with HEP to allow for increased function in affected limb.   (progressing, not met)    Plan   Plan of care Certification: 8/15/2023 to 11/7/2023.    Outpatient Physical Therapy 1 times weekly for 12 weeks to include the following interventions: Manual Therapy, Patient Education, Self Care, Therapeutic Activities, Therapeutic Exercise, and vaso-pneumatic compression . Complete Decongestive Therapy- compression and home equipment needs to be addressed and assisted.    Denae Mtz, PT, CLT-TAMIE

## 2023-09-15 ENCOUNTER — CLINICAL SUPPORT (OUTPATIENT)
Dept: REHABILITATION | Facility: HOSPITAL | Age: 68
End: 2023-09-15
Payer: MEDICARE

## 2023-09-15 DIAGNOSIS — I89.0 LYMPHEDEMA ASSOCIATED WITH OBESITY: Primary | ICD-10-CM

## 2023-09-15 DIAGNOSIS — E66.9 LYMPHEDEMA ASSOCIATED WITH OBESITY: Primary | ICD-10-CM

## 2023-09-15 PROCEDURE — 97535 SELF CARE MNGMENT TRAINING: CPT | Mod: PN

## 2023-09-15 NOTE — PROGRESS NOTES
[facility]   Physical Therapy Treatment Note/Lymphedema       Name: Kimberley Levy  Clinic Number: [id number]    Therapy Diagnosis:   Encounter Diagnosis   Name Primary?    Lymphedema associated with obesity Yes      Physician: Mauricio Olvera MD    Visit Date: 9/15/2023     Physician Orders: PT Eval and Treat  Medical Diagnosis from Referral: Lymphedema, arthrofibrosis of TKA  Evaluation Date: 8/15/2023  Visit # / Visits authorized: 1    Time In: 1105 am  Time Out: 12:00 pm  Total Billable Time: 55 minutes    Precautions: Standard    Subjective     Pt reports: having the velcro wraps but not sure if she is using them correctly.  She was compliant with home exercise program.  Response to previous treatment: good  Functional change: n/a        Objective     Objective Measures updated at progress report unless specified    Girth Measurements (in centimeters)  LANDMARK LEFT LE  8/15/2023 RIGHT LE  8/15/2023 DIFF   at eval   SBP + 20 cm - cm - cm - cm   SBP + 10 cm - cm - cm - cm   SBP 73.5 cm 79.5 cm - cm   10 cm below SBP - cm - cm - cm   20 cm below SBP 47.5 cm 47.5 cm - cm   30 cm below SBP - cm - cm - cm   35 cm below SBP - cm - cm - cm   Ankle 29.5 cm 29 cm - cm   Forefoot - cm - cm - cm      Girth Measurements (in centimeters)  LANDMARK LEFT LE  9/15/2023 RIGHT LE  9/15/2023    SBP + 20 cm - cm - cm    SBP + 10 cm - cm - cm    SBP 74.5 cm 80 cm    10 cm below SBP - cm - cm    20 cm below SBP 45.5 cm 48.5 cm    30 cm below SBP - cm - cm    35 cm below SBP - cm - cm    Ankle 29.5 cm 29 cm    Forefoot - cm - cm      Treatment:     Kimberley received the following self care and home management x 55 minutes:    -training with patient and sitter on donning of compression socks and donning and doffing of velcro wraps with emphasis on adjustment of the straps  -faxed plan of care and 28+ day follow up visit information to [facility] for a compression pump  -recommended wearing velcro wraps day and  night initially to help push fluid out of lower extremities.  -recommended compression marck's for compression on the thighs  -home pump recommended to manage fluid      Assessment     Kimberley brought a sitter to today's visit to learn how to don the velcro wraps effectively. She received her compression over a week ago and wanted her caregivers to be able to help her.    Kimberley Is progressing well towards her goals. She has tried elevation, exercise, compression garments/bandaging for since 8/15/2023 and swelling still persists. Home pump is recommended   Pt prognosis is Good.     Pt will continue to benefit from skilled outpatient physical therapy to address the deficits listed in the problem list box on initial evaluation, provide pt/family education and to maximize pt's level of independence in the home and community environment.     Pt's spiritual, cultural and educational needs considered and pt agreeable to plan of care and goals.       Goals:     Short Term Goals: (6 weeks)  1. Patient will show decreased girth in bilateral lower extremities by up to 4 cm to allow for LE symmetry, shoe and clothing choice, and ability to apply needed compression.  (progressing, not met)   2. Patient will demonstrate 100% knowledge of lymphedema precautions and signs of infection to allow for reduced lymphedema risk, infection risk, and/or exacerbation of condition.  (progressing, not met)  3. Patient or caregiver will perform self-bandaging techniques and/or wearing of compression garments to allow for lymphatic drainage support, skin elasticity, and reduction in shape and size of limb. GOAL MET  4. Patient will perform self lymph drainage techniques to areas within reach to enhance lymphatic drainage and skin condition.  (progressing, not met)  5. Patient will tolerate daily activities with multilayered bandaging to allow for lymphatic and venous support.  (GOAL MET)     Long Term Goals: (12  weeks)  1. Patient will show  decreased girth in B LE by up to 6 cm  to allow for LE symmetry, shoe and clothing choice, and ability to apply needed compression daily.  (progressing, not met)  2. Patient will show reduction in density to mild or less with improved contour of limb to allow for cosmesis, LE symmetry, infection risk reduction, and clothing and compression choice.   (progressing, not met)  3. Patient to claudia/doff compression garment with daily compliance to assist in lymphedema management, skin elasticity, and tissue density.  (GOAL MET)  4. Pt to show improved postural awareness and alignment.  (progressing, not met)  5. Pt to be I and compliant with HEP to allow for increased function in affected limb.   (progressing, not met)     Plan   DISCHARGE FROM LYMPHEDEMA THERAPY       Denae Mtz, PT

## 2023-10-10 ENCOUNTER — TELEPHONE (OUTPATIENT)
Dept: PAIN MEDICINE | Facility: CLINIC | Age: 68
End: 2023-10-10
Payer: MEDICARE

## 2023-10-10 NOTE — TELEPHONE ENCOUNTER
----- Message from Kelsea shirin sent at 10/10/2023  4:24 PM CDT -----  Name of Who is Calling:[facility]ursing Home           What is the request in detail:Patient has appt tomorrow and would like to make sure that the antibiotics will not interfere with the injection she will be receiving tomorrow during her appt.           Can the clinic reply by [facility]:no           What Number to Call Back if not in [facility]: [phone] Daisy or

## 2023-10-11 ENCOUNTER — OFFICE VISIT (OUTPATIENT)
Dept: PAIN MEDICINE | Facility: CLINIC | Age: 68
End: 2023-10-11
Payer: MEDICARE

## 2023-10-11 VITALS
WEIGHT: 293 LBS | DIASTOLIC BLOOD PRESSURE: 69 MMHG | SYSTOLIC BLOOD PRESSURE: 130 MMHG | HEIGHT: 64 IN | BODY MASS INDEX: 50.02 KG/M2 | HEART RATE: 82 BPM

## 2023-10-11 DIAGNOSIS — T84.82XA ARTHROFIBROSIS OF TOTAL KNEE ARTHROPLASTY, INITIAL ENCOUNTER: ICD-10-CM

## 2023-10-11 DIAGNOSIS — L03.119 CELLULITIS OF LOWER EXTREMITY, UNSPECIFIED LATERALITY: ICD-10-CM

## 2023-10-11 DIAGNOSIS — M16.0 BILATERAL HIP JOINT ARTHRITIS: Primary | ICD-10-CM

## 2023-10-11 DIAGNOSIS — Z96.651 HISTORY OF TOTAL RIGHT KNEE REPLACEMENT: ICD-10-CM

## 2023-10-11 DIAGNOSIS — E66.01 MORBID OBESITY WITH BMI OF 50.0-59.9, ADULT: ICD-10-CM

## 2023-10-11 DIAGNOSIS — Z96.652 HISTORY OF TOTAL LEFT KNEE REPLACEMENT: ICD-10-CM

## 2023-10-11 PROCEDURE — 3288F PR FALLS RISK ASSESSMENT DOCUMENTED: ICD-10-PCS | Mod: CPTII,S$GLB,, | Performed by: PHYSICAL MEDICINE & REHABILITATION

## 2023-10-11 PROCEDURE — 3078F PR MOST RECENT DIASTOLIC BLOOD PRESSURE < 80 MM HG: ICD-10-PCS | Mod: CPTII,S$GLB,, | Performed by: PHYSICAL MEDICINE & REHABILITATION

## 2023-10-11 PROCEDURE — 3078F DIAST BP <80 MM HG: CPT | Mod: CPTII,S$GLB,, | Performed by: PHYSICAL MEDICINE & REHABILITATION

## 2023-10-11 PROCEDURE — 4010F ACE/ARB THERAPY RXD/TAKEN: CPT | Mod: CPTII,S$GLB,, | Performed by: PHYSICAL MEDICINE & REHABILITATION

## 2023-10-11 PROCEDURE — 99204 PR OFFICE/OUTPT VISIT, NEW, LEVL IV, 45-59 MIN: ICD-10-PCS | Mod: S$GLB,,, | Performed by: PHYSICAL MEDICINE & REHABILITATION

## 2023-10-11 PROCEDURE — 3075F PR MOST RECENT SYSTOLIC BLOOD PRESS GE 130-139MM HG: ICD-10-PCS | Mod: CPTII,S$GLB,, | Performed by: PHYSICAL MEDICINE & REHABILITATION

## 2023-10-11 PROCEDURE — 99204 OFFICE O/P NEW MOD 45 MIN: CPT | Mod: S$GLB,,, | Performed by: PHYSICAL MEDICINE & REHABILITATION

## 2023-10-11 PROCEDURE — 1125F PR PAIN SEVERITY QUANTIFIED, PAIN PRESENT: ICD-10-PCS | Mod: CPTII,S$GLB,, | Performed by: PHYSICAL MEDICINE & REHABILITATION

## 2023-10-11 PROCEDURE — 4010F PR ACE/ARB THEARPY RXD/TAKEN: ICD-10-PCS | Mod: CPTII,S$GLB,, | Performed by: PHYSICAL MEDICINE & REHABILITATION

## 2023-10-11 PROCEDURE — 1125F AMNT PAIN NOTED PAIN PRSNT: CPT | Mod: CPTII,S$GLB,, | Performed by: PHYSICAL MEDICINE & REHABILITATION

## 2023-10-11 PROCEDURE — 3288F FALL RISK ASSESSMENT DOCD: CPT | Mod: CPTII,S$GLB,, | Performed by: PHYSICAL MEDICINE & REHABILITATION

## 2023-10-11 PROCEDURE — 3075F SYST BP GE 130 - 139MM HG: CPT | Mod: CPTII,S$GLB,, | Performed by: PHYSICAL MEDICINE & REHABILITATION

## 2023-10-11 PROCEDURE — 99999 PR PBB SHADOW E&M-EST. PATIENT-LVL III: ICD-10-PCS | Mod: PBBFAC,,, | Performed by: PHYSICAL MEDICINE & REHABILITATION

## 2023-10-11 PROCEDURE — 3008F PR BODY MASS INDEX (BMI) DOCUMENTED: ICD-10-PCS | Mod: CPTII,S$GLB,, | Performed by: PHYSICAL MEDICINE & REHABILITATION

## 2023-10-11 PROCEDURE — 3008F BODY MASS INDEX DOCD: CPT | Mod: CPTII,S$GLB,, | Performed by: PHYSICAL MEDICINE & REHABILITATION

## 2023-10-11 PROCEDURE — 99999 PR PBB SHADOW E&M-EST. PATIENT-LVL III: CPT | Mod: PBBFAC,,, | Performed by: PHYSICAL MEDICINE & REHABILITATION

## 2023-10-11 PROCEDURE — 1101F PR PT FALLS ASSESS DOC 0-1 FALLS W/OUT INJ PAST YR: ICD-10-PCS | Mod: CPTII,S$GLB,, | Performed by: PHYSICAL MEDICINE & REHABILITATION

## 2023-10-11 PROCEDURE — 1101F PT FALLS ASSESS-DOCD LE1/YR: CPT | Mod: CPTII,S$GLB,, | Performed by: PHYSICAL MEDICINE & REHABILITATION

## 2023-10-11 NOTE — PROGRESS NOTES
New Patient Chronic Pain Note (Initial Visit)    Referring Physician: Mauricio Olvera MD    PCP: Lizy, Primary Doctor    Chief Complaint:   Chief Complaint   Patient presents with    Shoulder Pain    Knee Pain        SUBJECTIVE:    Kimberley Levy is a 68 y.o. female right-hand dominant, who presents to the clinic for the evaluation of knee and hip pain.  She is more concerned with her bilateral shoulder pain.  She was referred by Orthopedics for further evaluation and management of this pain.  She has past medical history of anxiety, depression, hypertension, peripheral arterial disease, hyperlipidemia, cardiomyopathy, morbid obesity, GERD, osteoarthritis of the knees s/p TKA bilaterally, JAYLAN, and multiple other medical comorbidities as listed in her chart.  The pain started several years ago  and symptoms have been worsening.The pain is located in the bilateral hips area and radiates to the bilateral knees.  She also reports pain in both of her shoulders..  The pain is described as  , stabbing  and is rated as 2/10. The pain is rated with a score of  0/10 on the BEST day and a score of 7/10 on the WORST day.  Symptoms interfere with daily activity. The pain is exacerbated by transfers in and out of a wheelchair.  The pain is mitigated by rest.  She lives in a nursing home and is currently active in physical therapy which has been helpful for her knee and hip pain.    Patient denies night fever/night sweats, urinary incontinence, bowel incontinence, significant weight loss, significant motor weakness, and loss of sensations.    Pain Disability Index Review:         10/11/2023    10:31 AM   Last 3 PDI Scores   Pain Disability Index (PDI) 0       Non-Pharmacologic Treatments:  Physical Therapy/Home Exercise: yes, currently active  Ice/Heat:yes  TENS: no  Acupuncture: no  Massage: no  Chiropractic: no    Other: no      Pain Medications:  - Opioids:  Denies  - Adjuvant Medications:  Elavil, BuSpar, Atarax, NSAIDs  -  Anti-Coagulants:  None     report:  Reviewed and consistent with medication use as prescribed.  No controlled substances recently prescribed.    Pain Procedures:   Denies      Imaging:   X-ray bilateral hips 08/07/2023:   There is a large amount of stool in the colon.     No acute fracture or dislocation is identified.  Advanced osteoarthritis noted involving both hips.    X-ray bilateral knees 07/27/2023:   No comparison studies are available.  4 total views of the bilateral knees were submitted for interpretation.  There are bilateral total knee arthroplasty device is in place.  The hardware and surrounding osseous structures appear to be intact.  No definite right or left knee joint effusion.     Alignment is satisfactory. No     fractures, dislocations, or erosive arthritic change.  Negative for radiopaque foreign bodies or air in the soft tissues.  Soft tissue calcifications are seen involving the anterior proximal left greater than right calf.  Moderate edema of the distal thighs and proximal gas noted bilaterally.    No past medical history on file.  No past surgical history on file.  Social History     Socioeconomic History    Marital status:    Tobacco Use    Smoking status: Never    Smokeless tobacco: Never     No family history on file.    Review of patient's allergies indicates:   Allergen Reactions    Penicillins Hives    Prednisone        Current Outpatient Medications   Medication Sig    amitriptyline (ELAVIL) 50 MG tablet Take 50 mg by mouth every evening.    amLODIPine (NORVASC) 10 MG tablet TAKE 1 TABLET EVERY DAY    busPIRone (BUSPAR) 10 MG tablet TAKE 1 TABLET TWICE DAILY    furosemide (LASIX) 80 MG tablet Take 80 mg by mouth.    hydrOXYzine HCL (ATARAX) 25 MG tablet Take 25 mg by mouth 2 (two) times daily.    ibuprofen (ADVIL,MOTRIN) 200 MG tablet Take 200 mg by mouth every 6 (six) hours as needed for Pain.    lisinopriL (PRINIVIL,ZESTRIL) 2.5 MG tablet Take 1 tablet (2.5 mg total) by  "mouth once daily. Please call us at [phone] to schedule check up.    metoprolol succinate (TOPROL-XL) 25 MG 24 hr tablet TAKE 1 TABLET EVERY DAY    pantoprazole (PROTONIX) 40 MG tablet TAKE 1 TABLET EVERY MORNING    pravastatin (PRAVACHOL) 40 MG tablet TAKE 1 TABLET EVERY EVENING    terbinafine HCL (LAMISIL) 1 % cream 1 THIN LAYER  .    triamcinolone acetonide 0.1% (KENALOG) 0.1 % cream      No current facility-administered medications for this visit.       Review of Systems     GENERAL:  No weight loss, malaise or fevers.  HEENT:   No recent changes in vision or hearing  NECK:  Negative for lumps, no difficulty with swallowing.  RESPIRATORY:  Negative for cough, wheezing or shortness of breath, patient denies any recent URI.  CARDIOVASCULAR:  Negative for chest pain, leg swelling or palpitations.  GI:  Negative for abdominal discomfort, blood in stools or black stools or change in bowel habits.  MUSCULOSKELETAL:  See HPI.  SKIN:  Negative for lesions, rash, and itching.  PSYCH:  No mood disorder or recent psychosocial stressors.  Patients sleep is not disturbed secondary to pain.  HEMATOLOGY/LYMPHOLOGY:  Negative for prolonged bleeding, bruising easily or swollen nodes.  Patient is not currently taking any anti-coagulants  NEURO:   No history of headaches, syncope, paralysis, seizures or tremors.  All other reviewed and negative other than HPI.    OBJECTIVE:    /69 (BP Location: Right arm, Patient Position: Sitting)   Pulse 82   Ht 5' 4" (1.626 m)   Wt (!) 151 kg (332 lb 14.3 oz)   BMI 57.14 kg/m²         Physical Exam    GENERAL: Well appearing, in no acute distress, alert and oriented x3.  Morbidly obese  PSYCH:  Mood and affect appropriate.  SKIN:  Evidence of cellulitis of both of her lower extremities.  Otherwise Skin color, texture, turgor normal, no rashes or lesions.  HEAD/FACE:  Normocephalic, atraumatic. Cranial nerves grossly intact.  CV: RRR with palpation of the radial artery.  PULM: No " evidence of respiratory difficulty, symmetric chest rise.  GI:  Soft and non-tender.  BACK: Straight leg raising in the sitting and supine positions is negative to radicular pain. No pain to palpation over the facet joints of the lumbar spine or spinous processes.  Limited range of motion secondary to body habitus and pain reproduction.  EXTREMITIES:  Significant swelling of the bilateral lower extremities No other deformities, edema, or skin discoloration. Good capillary refill.  MUSCULOSKELETAL:  Externally rotated left hip.  Pain with internal rotation of both hips, left worse than right. Bilateral upper and lower extremity strength is normal and symmetric.  No atrophy or tone abnormalities are noted.  NEURO: Bilateral upper and lower extremity coordination and muscle stretch reflexes are physiologic and symmetric.  Plantar response are downgoing. No clonus.  No loss of sensation is noted.  GAIT:  Wheelchair-bound.      LABS:  Lab Results   Component Value Date    WBC 9.4 04/27/2021    HGB 13.1 04/27/2021    HCT 41.4 04/27/2021    MCV 86.1 06/17/2004     04/27/2021       CMP  Sodium   Date Value Ref Range Status   06/17/2004 141 136 - 145 mMol/l Final     Potassium   Date Value Ref Range Status   06/17/2004 3.6 3.3 - 5.3 mMol/l Final     Chloride   Date Value Ref Range Status   06/17/2004 106 95 - 110 mMol/l Final     CO2   Date Value Ref Range Status   06/17/2004 20 (L) 23.0 - 29.0 mEq/L Final     Glucose   Date Value Ref Range Status   06/17/2004 96 70 - 110 mg/dl Final     BUN   Date Value Ref Range Status   06/17/2004 14 5 - 23 mg/dl Final     Creatinine   Date Value Ref Range Status   06/17/2004 0.9 0.5 - 1.4 mg/dl Final     Calcium   Date Value Ref Range Status   06/17/2004 9.9 8.7 - 10.5 mg/dl Final     Total Protein   Date Value Ref Range Status   03/11/2004 7.6 6.0 - 8.4 gm/dl Final     Albumin   Date Value Ref Range Status   03/11/2004 4.6 3.5 - 5.5 g/dl Final     Total Bilirubin   Date Value Ref  Range Status   03/11/2004 0.4 0.1 - 1.0 mg/dl Final     Alkaline Phosphatase   Date Value Ref Range Status   03/11/2004 108 45 - 130 U/L Final     AST   Date Value Ref Range Status   03/11/2004 27 0 - 31 U/L Final     ALT   Date Value Ref Range Status   03/11/2004 12 0 - 31 U/L Final       Lab Results   Component Value Date    HGBA1C 6.1 08/12/2021             ASSESSMENT: 68 y.o. year old female with hip pain, consistent with     1. Bilateral hip joint arthritis        2. History of total left knee replacement  Ambulatory referral/consult to Pain Clinic      3. History of total right knee replacement  Ambulatory referral/consult to Pain Clinic      4. Arthrofibrosis of total knee arthroplasty, initial encounter  Ambulatory referral/consult to Pain Clinic            PLAN:   - Interventions:  None at this time due to concerns of chronic cellulitis, but will consider bilateral hip joint injections if this is eradicated and controlled.    - Anticoagulation use:  None    - Medications: I have stressed the importance of physical activity and a home exercise plan to help with pain and improve health. and Patient can continue with medications for now since they are providing benefits, using them appropriately, and without side effects.     - Therapy:  Advised patient continue with physical therapy at nursing home    - Psychological:  Discussed coping mechanisms of address chronic pain issues    - Labs:  Reviewed    - Imaging: Reviewed available imaging with patient and answered any questions they had regarding study.    - Consults/Referrals:  None at this time    - Records:  Reviewed/Obtain old records from outside physicians and imaging    - Follow up visit: return to clinic as needed    - Counseled patient regarding the importance of activity modification and physical therapy    - This condition does not require this patient to take time off of work, and the primary goal of our Pain Management services is to improve the  patient's functional capacity.    - Patient Questions: Answered all of the patient's questions regarding diagnosis, therapy, and treatment        The above plan and management options were discussed at length with patient. Patient is in agreement with the above and verbalized understanding.    I discussed the goals of interventional chronic pain management with the patient on today's visit.  I explained the utility of injections for diagnostic and therapeutic purposes.  We discussed a multimodal approach to pain including treating the patient's given worst pain at any given time.  We will use a systematic approach to addressing pain.  We will also adopt a multimodal approach that includes injections, adjuvant medications, physical therapy, at times psychiatry.  There may be a limited role for opioid use intermittently in the treatment of pain, more particularly for acute pain although no one approach can be used as a sole treatment modality.    I emphasized the importance of regular exercise, core strengthening and stretching, diet and weight loss as a cornerstone of long-term pain management.      Vijay Paul MD  Interventional Pain Management  [facility]    Disclaimer:  This note was prepared using voice recognition system and is likely to have sound alike errors that may have been overlooked even after proof reading.  Please call me with any questions

## 2023-10-12 ENCOUNTER — OFFICE VISIT (OUTPATIENT)
Dept: RHEUMATOLOGY | Facility: CLINIC | Age: 68
End: 2023-10-12
Payer: MEDICARE

## 2023-10-12 ENCOUNTER — HOSPITAL ENCOUNTER (OUTPATIENT)
Dept: RADIOLOGY | Facility: HOSPITAL | Age: 68
Discharge: HOME | End: 2023-10-12
Attending: INTERNAL MEDICINE
Payer: MEDICARE

## 2023-10-12 VITALS
BODY MASS INDEX: 57.14 KG/M2 | DIASTOLIC BLOOD PRESSURE: 69 MMHG | HEART RATE: 82 BPM | WEIGHT: 293 LBS | SYSTOLIC BLOOD PRESSURE: 130 MMHG

## 2023-10-12 DIAGNOSIS — L40.9 PSORIASIS: ICD-10-CM

## 2023-10-12 DIAGNOSIS — M25.512 CHRONIC PAIN OF BOTH SHOULDERS: ICD-10-CM

## 2023-10-12 DIAGNOSIS — T84.82XA ARTHROFIBROSIS OF TOTAL KNEE ARTHROPLASTY, INITIAL ENCOUNTER: ICD-10-CM

## 2023-10-12 DIAGNOSIS — M25.511 CHRONIC PAIN OF BOTH SHOULDERS: ICD-10-CM

## 2023-10-12 DIAGNOSIS — G89.29 CHRONIC PAIN OF BOTH SHOULDERS: ICD-10-CM

## 2023-10-12 DIAGNOSIS — L40.0 PLAQUE PSORIASIS: Primary | ICD-10-CM

## 2023-10-12 PROCEDURE — 1101F PR PT FALLS ASSESS DOC 0-1 FALLS W/OUT INJ PAST YR: ICD-10-PCS | Mod: CPTII,S$GLB,, | Performed by: INTERNAL MEDICINE

## 2023-10-12 PROCEDURE — 1125F PR PAIN SEVERITY QUANTIFIED, PAIN PRESENT: ICD-10-PCS | Mod: CPTII,S$GLB,, | Performed by: INTERNAL MEDICINE

## 2023-10-12 PROCEDURE — 3008F BODY MASS INDEX DOCD: CPT | Mod: CPTII,S$GLB,, | Performed by: INTERNAL MEDICINE

## 2023-10-12 PROCEDURE — 3288F FALL RISK ASSESSMENT DOCD: CPT | Mod: CPTII,S$GLB,, | Performed by: INTERNAL MEDICINE

## 2023-10-12 PROCEDURE — 73030 X-RAY EXAM OF SHOULDER: CPT | Mod: TC,50

## 2023-10-12 PROCEDURE — 73030 XR SHOULDER COMPLETE 2 OR MORE VIEWS BILATERAL: ICD-10-PCS | Mod: 26,50,, | Performed by: RADIOLOGY

## 2023-10-12 PROCEDURE — 3008F PR BODY MASS INDEX (BMI) DOCUMENTED: ICD-10-PCS | Mod: CPTII,S$GLB,, | Performed by: INTERNAL MEDICINE

## 2023-10-12 PROCEDURE — 3078F PR MOST RECENT DIASTOLIC BLOOD PRESSURE < 80 MM HG: ICD-10-PCS | Mod: CPTII,S$GLB,, | Performed by: INTERNAL MEDICINE

## 2023-10-12 PROCEDURE — 3075F PR MOST RECENT SYSTOLIC BLOOD PRESS GE 130-139MM HG: ICD-10-PCS | Mod: CPTII,S$GLB,, | Performed by: INTERNAL MEDICINE

## 2023-10-12 PROCEDURE — 4010F ACE/ARB THERAPY RXD/TAKEN: CPT | Mod: CPTII,S$GLB,, | Performed by: INTERNAL MEDICINE

## 2023-10-12 PROCEDURE — 99205 OFFICE O/P NEW HI 60 MIN: CPT | Mod: S$GLB,,, | Performed by: INTERNAL MEDICINE

## 2023-10-12 PROCEDURE — 3288F PR FALLS RISK ASSESSMENT DOCUMENTED: ICD-10-PCS | Mod: CPTII,S$GLB,, | Performed by: INTERNAL MEDICINE

## 2023-10-12 PROCEDURE — 99999 PR PBB SHADOW E&M-EST. PATIENT-LVL V: CPT | Mod: PBBFAC,,, | Performed by: INTERNAL MEDICINE

## 2023-10-12 PROCEDURE — 99999 PR PBB SHADOW E&M-EST. PATIENT-LVL V: ICD-10-PCS | Mod: PBBFAC,,, | Performed by: INTERNAL MEDICINE

## 2023-10-12 PROCEDURE — 3078F DIAST BP <80 MM HG: CPT | Mod: CPTII,S$GLB,, | Performed by: INTERNAL MEDICINE

## 2023-10-12 PROCEDURE — 4010F PR ACE/ARB THEARPY RXD/TAKEN: ICD-10-PCS | Mod: CPTII,S$GLB,, | Performed by: INTERNAL MEDICINE

## 2023-10-12 PROCEDURE — 99205 PR OFFICE/OUTPT VISIT, NEW, LEVL V, 60-74 MIN: ICD-10-PCS | Mod: S$GLB,,, | Performed by: INTERNAL MEDICINE

## 2023-10-12 PROCEDURE — 1125F AMNT PAIN NOTED PAIN PRSNT: CPT | Mod: CPTII,S$GLB,, | Performed by: INTERNAL MEDICINE

## 2023-10-12 PROCEDURE — 1101F PT FALLS ASSESS-DOCD LE1/YR: CPT | Mod: CPTII,S$GLB,, | Performed by: INTERNAL MEDICINE

## 2023-10-12 PROCEDURE — 1159F PR MEDICATION LIST DOCUMENTED IN MEDICAL RECORD: ICD-10-PCS | Mod: CPTII,S$GLB,, | Performed by: INTERNAL MEDICINE

## 2023-10-12 PROCEDURE — 3075F SYST BP GE 130 - 139MM HG: CPT | Mod: CPTII,S$GLB,, | Performed by: INTERNAL MEDICINE

## 2023-10-12 PROCEDURE — 73030 X-RAY EXAM OF SHOULDER: CPT | Mod: 26,50,, | Performed by: RADIOLOGY

## 2023-10-12 PROCEDURE — 1160F PR REVIEW ALL MEDS BY PRESCRIBER/CLIN PHARMACIST DOCUMENTED: ICD-10-PCS | Mod: CPTII,S$GLB,, | Performed by: INTERNAL MEDICINE

## 2023-10-12 PROCEDURE — 1159F MED LIST DOCD IN RCRD: CPT | Mod: CPTII,S$GLB,, | Performed by: INTERNAL MEDICINE

## 2023-10-12 PROCEDURE — 1160F RVW MEDS BY RX/DR IN RCRD: CPT | Mod: CPTII,S$GLB,, | Performed by: INTERNAL MEDICINE

## 2023-10-12 RX ORDER — CEFDINIR 300 MG/1
300 CAPSULE ORAL
COMMUNITY
Start: 2023-10-15

## 2023-10-12 RX ORDER — ZINC OXIDE 20 G/100G
OINTMENT TOPICAL
COMMUNITY

## 2023-10-12 RX ORDER — CEFDINIR 300 MG/1
CAPSULE ORAL
COMMUNITY
Start: 2023-10-07

## 2023-10-12 RX ORDER — CLOBETASOL PROPIONATE 0.5 MG/G
CREAM TOPICAL
COMMUNITY
Start: 2023-09-25

## 2023-10-12 RX ORDER — CLINDAMYCIN HYDROCHLORIDE 300 MG/1
300 CAPSULE ORAL EVERY 6 HOURS
COMMUNITY
Start: 2023-10-02

## 2023-10-12 RX ORDER — HYDROCORTISONE 10 MG/ML
LOTION TOPICAL 2 TIMES DAILY
COMMUNITY

## 2023-10-12 RX ORDER — ASPIRIN 325 MG
325 TABLET ORAL DAILY
COMMUNITY

## 2023-10-12 RX ORDER — APREMILAST 10-20-30MG
KIT ORAL
COMMUNITY

## 2023-10-12 NOTE — PROGRESS NOTES
X-ray showed severe osteoarthritis as expected.  Will refer to shoulder specialist for further management of shoulder pain.

## 2023-10-12 NOTE — PROGRESS NOTES
RHEUMATOLOGY CLINIC INITIAL VISIT    Reason for consult:-  For evaluation of psoriatic arthritis  Chief complaints, HPI, ROS, EXAM, Assessment & Plans:-  Kimberley Carnes a 68 y.o. pleasant female comes in for evaluation  of psoriatic arthritis.  Severe restriction in activities of daily living due to restricted mobility secondary to multiple factors including severe osteoarthritis of bilateral hip joints, congestive heart failure, morbid obesity, arthrofibrosis of total knee arthroplasty.  She was recently evaluated by our orthopedic surgeon and was referred here for evaluation of psoriatic arthritis with her worsening psoriasis vulgaris.  She was seen by dermatologist Dr. Campo for psoriasis vulgaris and was prescribed Otezla.  Upon review of her medication list today she is no longer on Otezla.  Recently she was admitted to hospital for severe CHF exacerbation complicated by cellulitis treated with antibiotics.  Today she complains of severe worsening bilateral shoulder pain with significant restricted range of motion due to pain.  She denies any history of dactylitis over fingers or toes prior to being admitted for CHF exacerbation.  Does not remember having psoriasis last year.  No significant pain or restriction in range of motion of bilateral fingers.  Rheumatological review of system negative otherwise.  Physical examination shows no small joint synovitis or dactylitis.  Severe tenderness of bilateral shoulder and acromioclavicular joints.  No tenderness or effusion of bilateral knees.  Could not stand from the wheelchair due to pain and restricted mobility.  Severe psoriasis vulgaris rash over bilateral upper extremities and lower extremities.  1. Plaque psoriasis    2. Arthrofibrosis of total knee arthroplasty, initial encounter    3. Psoriasis    4. Chronic pain of both shoulders      Problem List Items Addressed This Visit    None  Visit Diagnoses       Plaque psoriasis    -  Primary    Relevant Orders     LISA Screen w/Reflex    Cyclic Citrullinated Peptide Antibody, IgG    C-Reactive Protein    Sedimentation rate    Rheumatoid Factor    Hepatitis B Core Antibody, Total    Hepatitis B Surface Ab, Qualitative    Hepatitis B Surface Antigen    Hepatitis C Antibody    Quantiferon Gold TB    Arthrofibrosis of total knee arthroplasty, initial encounter        Relevant Orders    LISA Screen w/Reflex    Cyclic Citrullinated Peptide Antibody, IgG    C-Reactive Protein    Sedimentation rate    Rheumatoid Factor    Hepatitis B Core Antibody, Total    Hepatitis B Surface Ab, Qualitative    Hepatitis B Surface Antigen    Hepatitis C Antibody    Quantiferon Gold TB    Psoriasis        Relevant Orders    LISA Screen w/Reflex    Cyclic Citrullinated Peptide Antibody, IgG    C-Reactive Protein    Sedimentation rate    Rheumatoid Factor    Hepatitis B Core Antibody, Total    Hepatitis B Surface Ab, Qualitative    Hepatitis B Surface Antigen    Hepatitis C Antibody    Quantiferon Gold TB    Chronic pain of both shoulders        Relevant Orders    X-Ray Shoulder 2 or more views Bilat (Completed)    Ambulatory referral/consult to Orthopedics            Severe worsening psoriasis vulgaris.  I am not sure why she is not continuing Otezla at nursing home given by her dermatologist Dr. Campo.   Advised to contact her dermatologist.  Unfortunately I do not find any evidence of psoriatic arthritis on exam today.  Check serologies and inflammatory markers to rule out any other autoimmune inflammatory arthritis.  Pretest probability low for rheumatoid, lupus or other undifferentiated inflammatory arthritis.  Inflammatory markers may be elevated due to active psoriasis vulgaris.  Severe worsening bilateral shoulder pain likely secondary to osteoarthritis.  X-ray and refer to orthopedic surgeon-shoulder specialist for injection and further treatment.  I have explained all of the above in detail and the patient understands all of the current  recommendation(s). I have answered all questions to the best of my ability and to their complete satisfaction.     Total time spent 73 minutes including time needed to review the records, the   patient evaluation, documentation, face-to-face discussion with the patient,    coordination of care and counseling.    Level V visit.      # Follow up if symptoms worsen or fail to improve.      History reviewed. No pertinent past medical history.    History reviewed. No pertinent surgical history.     Social History     Tobacco Use    Smoking status: Never    Smokeless tobacco: Never       History reviewed. No pertinent family history.    Review of patient's allergies indicates:   Allergen Reactions    Penicillins Hives    Prednisone        Medication List with Changes/Refills   Current Medications    AMITRIPTYLINE (ELAVIL) 50 MG TABLET    Take 50 mg by mouth every evening.    AMLODIPINE (NORVASC) 10 MG TABLET    TAKE 1 TABLET EVERY DAY    APREMILAST (OTEZLA STARTER) 10 MG (4)-20 MG (4)-30 MG(19) DSPK    Take by mouth.    ASPIRIN 162.5 MG CP24    Take 81 mg by mouth.    ASPIRIN 325 MG TABLET    Take 325 mg by mouth once daily.    BUSPIRONE (BUSPAR) 10 MG TABLET    TAKE 1 TABLET TWICE DAILY    CEFDINIR (OMNICEF) 300 MG CAPSULE    Take by mouth.    CEFDINIR (OMNICEF) 300 MG CAPSULE    Take 300 mg by mouth.    CLINDAMYCIN (CLEOCIN) 300 MG CAPSULE    Take 300 mg by mouth every 6 (six) hours.    CLOBETASOL (TEMOVATE) 0.05 % CREAM    SMARTSI Topical Twice Daily    CLOBETASOL (TEMOVATE) 0.05 % CREAM    Place onto the skin.    FUROSEMIDE (LASIX) 80 MG TABLET    Take 80 mg by mouth.    HYDROCORTISONE 1 % LOTION    Apply topically 2 (two) times daily.    HYDROXYZINE HCL (ATARAX) 25 MG TABLET    Take 25 mg by mouth 2 (two) times daily.    IBUPROFEN (ADVIL,MOTRIN) 200 MG TABLET    Take 200 mg by mouth every 6 (six) hours as needed for Pain.    LISINOPRIL (PRINIVIL,ZESTRIL) 2.5 MG TABLET    Take 1 tablet (2.5 mg total) by mouth once  daily. Please call us at [phone] to schedule check up.    METOPROLOL SUCCINATE (TOPROL-XL) 25 MG 24 HR TABLET    TAKE 1 TABLET EVERY DAY    PANTOPRAZOLE (PROTONIX) 40 MG TABLET    TAKE 1 TABLET EVERY MORNING    PRAVASTATIN (PRAVACHOL) 40 MG TABLET    TAKE 1 TABLET EVERY EVENING    TERBINAFINE HCL (LAMISIL) 1 % CREAM    1 THIN LAYER  .    TRIAMCINOLONE ACETONIDE 0.1% (KENALOG) 0.1 % CREAM        ZINC OXIDE 20 % OINTMENT    Apply topically as needed for Dry Skin.         Thank you for allowing me to participate in the care ofMarlatasha DAMON Levy.    Disclaimer: This note was prepared using voice recognition system and is likely to have sound alike errors and is not proof read.  Please call me with any questions.

## 2023-10-16 ENCOUNTER — TELEPHONE (OUTPATIENT)
Dept: RHEUMATOLOGY | Facility: CLINIC | Age: 68
End: 2023-10-16
Payer: MEDICARE

## 2023-10-16 NOTE — TELEPHONE ENCOUNTER
----- Message from Isaiah Newton MD sent at 10/15/2023 12:04 PM CDT -----  Elevated inflammation . Please contact your dermatologist  to restart yue for active psoriasis.

## 2023-12-05 ENCOUNTER — OFFICE VISIT (OUTPATIENT)
Dept: ORTHOPEDICS | Facility: CLINIC | Age: 68
End: 2023-12-05
Payer: MEDICARE

## 2023-12-05 VITALS — BODY MASS INDEX: 50.02 KG/M2 | HEIGHT: 64 IN | WEIGHT: 293 LBS

## 2023-12-05 DIAGNOSIS — M25.512 ACUTE PAIN OF BOTH SHOULDERS: Primary | ICD-10-CM

## 2023-12-05 DIAGNOSIS — M25.511 ACUTE PAIN OF BOTH SHOULDERS: Primary | ICD-10-CM

## 2023-12-05 PROCEDURE — 3008F BODY MASS INDEX DOCD: CPT | Mod: CPTII,S$GLB,, | Performed by: PHYSICIAN ASSISTANT

## 2023-12-05 PROCEDURE — 1160F RVW MEDS BY RX/DR IN RCRD: CPT | Mod: CPTII,S$GLB,, | Performed by: PHYSICIAN ASSISTANT

## 2023-12-05 PROCEDURE — 1159F MED LIST DOCD IN RCRD: CPT | Mod: CPTII,S$GLB,, | Performed by: PHYSICIAN ASSISTANT

## 2023-12-05 PROCEDURE — 1126F AMNT PAIN NOTED NONE PRSNT: CPT | Mod: CPTII,S$GLB,, | Performed by: PHYSICIAN ASSISTANT

## 2023-12-05 PROCEDURE — 3008F PR BODY MASS INDEX (BMI) DOCUMENTED: ICD-10-PCS | Mod: CPTII,S$GLB,, | Performed by: PHYSICIAN ASSISTANT

## 2023-12-05 PROCEDURE — 4010F ACE/ARB THERAPY RXD/TAKEN: CPT | Mod: CPTII,S$GLB,, | Performed by: PHYSICIAN ASSISTANT

## 2023-12-05 PROCEDURE — 1160F PR REVIEW ALL MEDS BY PRESCRIBER/CLIN PHARMACIST DOCUMENTED: ICD-10-PCS | Mod: CPTII,S$GLB,, | Performed by: PHYSICIAN ASSISTANT

## 2023-12-05 PROCEDURE — 1101F PT FALLS ASSESS-DOCD LE1/YR: CPT | Mod: CPTII,S$GLB,, | Performed by: PHYSICIAN ASSISTANT

## 2023-12-05 PROCEDURE — 99214 OFFICE O/P EST MOD 30 MIN: CPT | Mod: S$GLB,,, | Performed by: PHYSICIAN ASSISTANT

## 2023-12-05 PROCEDURE — 3288F FALL RISK ASSESSMENT DOCD: CPT | Mod: CPTII,S$GLB,, | Performed by: PHYSICIAN ASSISTANT

## 2023-12-05 PROCEDURE — 4010F PR ACE/ARB THEARPY RXD/TAKEN: ICD-10-PCS | Mod: CPTII,S$GLB,, | Performed by: PHYSICIAN ASSISTANT

## 2023-12-05 PROCEDURE — 1101F PR PT FALLS ASSESS DOC 0-1 FALLS W/OUT INJ PAST YR: ICD-10-PCS | Mod: CPTII,S$GLB,, | Performed by: PHYSICIAN ASSISTANT

## 2023-12-05 PROCEDURE — 99214 PR OFFICE/OUTPT VISIT, EST, LEVL IV, 30-39 MIN: ICD-10-PCS | Mod: S$GLB,,, | Performed by: PHYSICIAN ASSISTANT

## 2023-12-05 PROCEDURE — 1159F PR MEDICATION LIST DOCUMENTED IN MEDICAL RECORD: ICD-10-PCS | Mod: CPTII,S$GLB,, | Performed by: PHYSICIAN ASSISTANT

## 2023-12-05 PROCEDURE — 99999 PR PBB SHADOW E&M-EST. PATIENT-LVL IV: ICD-10-PCS | Mod: PBBFAC,,, | Performed by: PHYSICIAN ASSISTANT

## 2023-12-05 PROCEDURE — 1126F PR PAIN SEVERITY QUANTIFIED, NO PAIN PRESENT: ICD-10-PCS | Mod: CPTII,S$GLB,, | Performed by: PHYSICIAN ASSISTANT

## 2023-12-05 PROCEDURE — 99999 PR PBB SHADOW E&M-EST. PATIENT-LVL IV: CPT | Mod: PBBFAC,,, | Performed by: PHYSICIAN ASSISTANT

## 2023-12-05 PROCEDURE — 3288F PR FALLS RISK ASSESSMENT DOCUMENTED: ICD-10-PCS | Mod: CPTII,S$GLB,, | Performed by: PHYSICIAN ASSISTANT

## 2023-12-06 NOTE — PROGRESS NOTES
Subjective:      Patient ID: Kimberley Levy is a 68 y.o. female.    Chief Complaint: Pain of the Right Shoulder and Pain of the Left Shoulder      HPI: Kimberley Levy is a 68-year-old female in clinic today for evaluation of bilateral shoulder pain.  Patient reports that this has been ongoing for a couple of months.  She denies any relevant history of injury or trauma associated with this pain.  She denies numbness or tingling in the extremities.  She actually reports that today she has no pain.  She reports that her symptoms will flare up from time to time and she treats this with the leave, which she reports is very effective for her pain.  However, she takes aspirin and has been told that she should take a leave with the medication.  She is tried Tylenol, but reports that this does not seem to help very much, if at all.    History reviewed. No pertinent past medical history.    Current Outpatient Medications:     amitriptyline (ELAVIL) 50 MG tablet, Take 50 mg by mouth every evening., Disp: , Rfl:     amLODIPine (NORVASC) 10 MG tablet, TAKE 1 TABLET EVERY DAY, Disp: 90 tablet, Rfl: 0    apremilast (OTEZLA STARTER) 10 mg (4)-20 mg (4)-30 mg(19) DsPk, Take by mouth., Disp: , Rfl:     aspirin 162.5 mg Cp24, Take 81 mg by mouth., Disp: , Rfl:     aspirin 325 MG tablet, Take 325 mg by mouth once daily., Disp: , Rfl:     busPIRone (BUSPAR) 10 MG tablet, TAKE 1 TABLET TWICE DAILY, Disp: 180 tablet, Rfl: 0    cefdinir (OMNICEF) 300 MG capsule, Take by mouth., Disp: , Rfl:     cefdinir (OMNICEF) 300 MG capsule, Take 300 mg by mouth., Disp: , Rfl:     clindamycin (CLEOCIN) 300 MG capsule, Take 300 mg by mouth every 6 (six) hours., Disp: , Rfl:     clobetasoL (TEMOVATE) 0.05 % cream, SMARTSI Topical Twice Daily, Disp: , Rfl:     clobetasoL (TEMOVATE) 0.05 % cream, Place onto the skin., Disp: , Rfl:     furosemide (LASIX) 80 MG tablet, Take 80 mg by mouth., Disp: , Rfl:     hydrocortisone 1 % lotion, Apply topically 2 (two)  "times daily., Disp: , Rfl:     hydrOXYzine HCL (ATARAX) 25 MG tablet, Take 25 mg by mouth 2 (two) times daily., Disp: , Rfl:     ibuprofen (ADVIL,MOTRIN) 200 MG tablet, Take 200 mg by mouth every 6 (six) hours as needed for Pain., Disp: , Rfl:     lisinopriL (PRINIVIL,ZESTRIL) 2.5 MG tablet, Take 1 tablet (2.5 mg total) by mouth once daily. Please call us at [phone] to schedule check up., Disp: 90 tablet, Rfl: 0    metoprolol succinate (TOPROL-XL) 25 MG 24 hr tablet, TAKE 1 TABLET EVERY DAY, Disp: 90 tablet, Rfl: 0    pantoprazole (PROTONIX) 40 MG tablet, TAKE 1 TABLET EVERY MORNING, Disp: 90 tablet, Rfl: 0    pravastatin (PRAVACHOL) 40 MG tablet, TAKE 1 TABLET EVERY EVENING, Disp: 90 tablet, Rfl: 0    terbinafine HCL (LAMISIL) 1 % cream, 1 THIN LAYER  ., Disp: , Rfl:     triamcinolone acetonide 0.1% (KENALOG) 0.1 % cream, , Disp: , Rfl:     zinc oxide 20 % ointment, Apply topically as needed for Dry Skin., Disp: , Rfl:   Review of patient's allergies indicates:   Allergen Reactions    Penicillins Hives    Prednisone        Ht 5' 4" (1.626 m)   Wt (!) 150.6 kg (332 lb)   BMI 56.99 kg/m²     ROS      Objective:    Ortho Exam   Bilateral shoulders:  Skin is intact   No edema   TTP diffusely   ROM decreased secondary to pain   Compartments are soft and compressible   Motor exam normal   Sensation and pulses intact  Cap refill brisk    GEN: Well developed, well nourished female. AAOX3. No acute distress.   Head: Normocephalic, atraumatic.   Eyes: CHARLETTE  Neck: Trachea is midline, no adenopathy  Resp: Breathing unlabored.  Neuro: Motor function normal, Cranial nerves intact  Psych: Mood and affect appropriate.       Assessment:     Imaging:  X-ray bilateral shoulders was reviewed and is significant for severe bilateral glenohumeral arthritis.  There is also mild bilateral acromioclavicular arthritis.  No acute fracture or dislocation.        1. Acute pain of both shoulders          Plan:       Reviewed the " radiographs with the patient.  Discussed arthritis with her.  Explained that if her symptoms flare up we could consider corticosteroid injection.  Until then, I recommended that she continue with activity modification.  As far as her use of a leave, she does have an appointment with her cardiologist next week and I recommended that she discuss this medication with him.  If he does not want her taking a leave, then she should take Tylenol as needed.  We also discussed the use of topical Voltaren in her case.  Patient will return to clinic as needed.       Follow up if symptoms worsen or fail to improve.          Patient note was created using MModal Dictation.  Any errors in syntax or even information may not have been identified and edited on initial review prior to signing this note.